# Patient Record
Sex: FEMALE | Race: WHITE | NOT HISPANIC OR LATINO | RURAL
[De-identification: names, ages, dates, MRNs, and addresses within clinical notes are randomized per-mention and may not be internally consistent; named-entity substitution may affect disease eponyms.]

---

## 2017-03-02 ENCOUNTER — OFFICE VISIT (OUTPATIENT)
Dept: ENDOCRINOLOGY | Facility: CLINIC | Age: 62
End: 2017-03-02

## 2017-03-02 VITALS
HEART RATE: 100 BPM | BODY MASS INDEX: 39.69 KG/M2 | SYSTOLIC BLOOD PRESSURE: 150 MMHG | DIASTOLIC BLOOD PRESSURE: 80 MMHG | WEIGHT: 224 LBS | HEIGHT: 63 IN

## 2017-03-02 DIAGNOSIS — E11.59 HYPERTENSION ASSOCIATED WITH DIABETES (HCC): ICD-10-CM

## 2017-03-02 DIAGNOSIS — Z79.4 TYPE 2 DIABETES MELLITUS WITHOUT COMPLICATION, WITH LONG-TERM CURRENT USE OF INSULIN (HCC): Primary | ICD-10-CM

## 2017-03-02 DIAGNOSIS — E11.9 TYPE 2 DIABETES MELLITUS WITHOUT COMPLICATION, WITH LONG-TERM CURRENT USE OF INSULIN (HCC): Primary | ICD-10-CM

## 2017-03-02 DIAGNOSIS — E66.01 MORBID OBESITY DUE TO EXCESS CALORIES (HCC): ICD-10-CM

## 2017-03-02 DIAGNOSIS — E78.2 MIXED HYPERLIPIDEMIA DUE TO TYPE 2 DIABETES MELLITUS (HCC): ICD-10-CM

## 2017-03-02 DIAGNOSIS — E55.9 VITAMIN D DEFICIENCY: ICD-10-CM

## 2017-03-02 DIAGNOSIS — I15.2 HYPERTENSION ASSOCIATED WITH DIABETES (HCC): ICD-10-CM

## 2017-03-02 DIAGNOSIS — E11.69 MIXED HYPERLIPIDEMIA DUE TO TYPE 2 DIABETES MELLITUS (HCC): ICD-10-CM

## 2017-03-02 PROCEDURE — 99214 OFFICE O/P EST MOD 30 MIN: CPT | Performed by: INTERNAL MEDICINE

## 2017-03-02 RX ORDER — ROSUVASTATIN CALCIUM 20 MG/1
20 TABLET, COATED ORAL DAILY
Qty: 30 TABLET | Refills: 11 | Status: SHIPPED | OUTPATIENT
Start: 2017-03-02

## 2017-03-02 RX ORDER — AZITHROMYCIN 250 MG/1
TABLET, FILM COATED ORAL
Qty: 6 TABLET | Refills: 0 | Status: SHIPPED | OUTPATIENT
Start: 2017-03-02 | End: 2017-03-07

## 2017-03-02 NOTE — PATIENT INSTRUCTIONS
Humalog or Novolog or Apidra with meals      For every 15 grams of carbohydrates you take 3 units    +     Sliding scale before meals  For ever 50 points above 150 - take 2 units      Example    Your sugar is 237 and you will eat 60 grams    For the 237 it would be 4 units     For the 60 grams it would be 12 units     Total = 16 units     if you have a low sugar , decrease the amount of insulin per 15 grams to 2 units     ---    Stop tresiba and victoza    Replace with soliqua 45 units every morning , 30 minutes before bkfast    If your morning sugar is more than 150 for 3 days increase by 3 units to a max dose of 60      --    Keep metformin 500 mg xr with supper    ==    Add invokana 100 mg before bkfast, flushes sugar out     Stop if urinary or yeast infections     ======    Cholesterol stop mevacor, start crestor 20 mg at night

## 2017-03-02 NOTE — PROGRESS NOTES
"  Subjective    Daphnie Jay is a 62 y.o. female. she is here today for follow-up.    History of Present Illness         REASON - Diabetes     Duration 10 years      Timing - Diabetes is Constant     Quality - Uncontrolled - better controlled      Severity - moderate     Complications - none      Current symptoms/problems  polydipsia and polyuria - no longer      Alleviating Factors: Compliance      Side Effects none     Current diet  in general, an \"unhealthy\" diet - improved diet      Current exercise none     Current monitoring regimen: home blood tests - 3 times daily  Home blood sugar records: 120 up to 190    Lately hyperglycemia, had URI      Hypoglycemia none      The following portions of the patient's history were reviewed and updated as appropriate:   Past Medical History   Diagnosis Date   • Type 2 diabetes mellitus without complication 8/20/2016     Past Surgical History   Procedure Laterality Date   • Fracture surgery Left      Family History   Problem Relation Age of Onset   • Diabetes Mother      OB History     No data available        Current Outpatient Prescriptions   Medication Sig Dispense Refill   • aspirin 81 MG chewable tablet Chew 81 mg daily.     • Insulin Degludec (TRESIBA FLEXTOUCH) 100 UNIT/ML solution pen-injector Inject 45 Units under the skin every night. 15 mL 11   • Insulin Pen Needle (B-D UF III MINI PEN NEEDLES) 31G X 5 MM misc Use 2 times daily 60 each 11   • Liraglutide 18 MG/3ML solution pen-injector Inject 1.8 mg under the skin Daily. 1.2 mg subcutaneously daily 3 pen 11   • lisinopril (PRINIVIL,ZESTRIL) 5 MG tablet   0   • lovastatin (MEVACOR) 40 MG tablet   0   • metFORMIN XR (GLUCOPHAGE XR) 500 MG 24 hr tablet Take 2 tablets by mouth Daily With Dinner. 60 tablet 11   • ONE TOUCH ULTRA TEST test strip   1     No current facility-administered medications for this visit.      Allergies   Allergen Reactions   • Demerol [Meperidine]      Social History     Social History   • " Marital status:      Spouse name: N/A   • Number of children: N/A   • Years of education: N/A     Social History Main Topics   • Smoking status: Never Smoker   • Smokeless tobacco: Never Used   • Alcohol use No   • Drug use: None   • Sexual activity: Not Asked     Other Topics Concern   • None     Social History Narrative       Review of Systems  Review of Systems   Constitutional: Negative for activity change, appetite change, diaphoresis and fatigue.   HENT: Negative for congestion, dental problem, drooling, ear discharge, ear pain, facial swelling, sneezing, sore throat, tinnitus, trouble swallowing and voice change.    Eyes: Negative for photophobia, pain, discharge, redness, itching and visual disturbance.   Respiratory: Negative for apnea, cough, choking, chest tightness and shortness of breath.    Cardiovascular: Negative for chest pain, palpitations and leg swelling.   Gastrointestinal: Negative for abdominal distention, abdominal pain, constipation, diarrhea, nausea and vomiting.   Endocrine: Negative for cold intolerance, heat intolerance, polydipsia, polyphagia and polyuria.   Genitourinary: Negative for difficulty urinating, dysuria, frequency, hematuria and urgency.   Musculoskeletal: Negative for arthralgias, back pain, gait problem, joint swelling, myalgias, neck pain and neck stiffness.   Skin: Negative for color change, pallor, rash and wound.   Allergic/Immunologic: Negative for environmental allergies, food allergies and immunocompromised state.   Neurological: Negative for dizziness, tremors, facial asymmetry, weakness, light-headedness, numbness and headaches.   Hematological: Negative for adenopathy. Does not bruise/bleed easily.   Psychiatric/Behavioral: Negative for agitation, behavioral problems, confusion, decreased concentration, dysphoric mood and sleep disturbance.        Objective      Visit Vitals   • /80 (BP Location: Left arm, Patient Position: Sitting, Cuff Size: Adult)  "  • Pulse 100   • Ht 63\" (160 cm)   • Wt 224 lb (102 kg)   • BMI 39.68 kg/m2     Physical Exam   Constitutional: She is oriented to person, place, and time. She appears well-developed and well-nourished. No distress.   HENT:   Head: Normocephalic and atraumatic.   Right Ear: External ear normal.   Left Ear: External ear normal.   Nose: Nose normal.   Eyes: Conjunctivae and EOM are normal. Pupils are equal, round, and reactive to light.   Neck: Normal range of motion. Neck supple. No tracheal deviation present. No thyromegaly present.   Cardiovascular: Normal rate, regular rhythm and normal heart sounds.    No murmur heard.  Pulmonary/Chest: Effort normal and breath sounds normal. No respiratory distress. She has no wheezes.   Abdominal: Soft. Bowel sounds are normal. There is no tenderness. There is no rebound and no guarding.   Musculoskeletal: Normal range of motion. She exhibits no edema, tenderness or deformity.   Left arm in sling   Neurological: She is alert and oriented to person, place, and time. No cranial nerve deficit.   Skin: Skin is warm and dry. No rash noted.   Psychiatric: She has a normal mood and affect. Her behavior is normal. Judgment and thought content normal.       Lab Review  GLUCOSE (mg/dl)   Date Value   12/22/2016 234 (H)     SODIUM (mmol/L)   Date Value   12/22/2016 137     POTASSIUM (mmol/L)   Date Value   12/22/2016 4.6     CHLORIDE (mmol/L)   Date Value   12/22/2016 97     CO2 (mmol/L)   Date Value   12/22/2016 26     BUN (mg/dl)   Date Value   12/22/2016 15     CREATININE (mg/dl)   Date Value   12/22/2016 0.7     HEMOGLOBIN A1C (%TotHgb)   Date Value   12/22/2016 10.3 (H)     TRIGLYCERIDES (mg/dl)   Date Value   12/22/2016 205 (H)       Assessment/Plan      1. Type 2 diabetes mellitus without complication, with long-term current use of insulin    2. Mixed hyperlipidemia due to type 2 diabetes mellitus    3. Hypertension associated with diabetes    4. Vitamin D deficiency    5. Morbid " obesity due to excess calories    .    Medications prescribed:  Outpatient Encounter Prescriptions as of 3/2/2017   Medication Sig Dispense Refill   • aspirin 81 MG chewable tablet Chew 81 mg daily.     • Insulin Degludec (TRESIBA FLEXTOUCH) 100 UNIT/ML solution pen-injector Inject 45 Units under the skin every night. 15 mL 11   • Insulin Pen Needle (B-D UF III MINI PEN NEEDLES) 31G X 5 MM misc Use 2 times daily 60 each 11   • Liraglutide 18 MG/3ML solution pen-injector Inject 1.8 mg under the skin Daily. 1.2 mg subcutaneously daily 3 pen 11   • lisinopril (PRINIVIL,ZESTRIL) 5 MG tablet   0   • lovastatin (MEVACOR) 40 MG tablet   0   • metFORMIN XR (GLUCOPHAGE XR) 500 MG 24 hr tablet Take 2 tablets by mouth Daily With Dinner. 60 tablet 11   • ONE TOUCH ULTRA TEST test strip   1     No facility-administered encounter medications on file as of 3/2/2017.        Orders placed during this encounter include:  No orders of the defined types were placed in this encounter.        Type 2 diabetes mellitus without complication  Previously we stopped avandia and CAMPUZANO  bg have improved from 300s to 100-200     Lab Results   Component Value Date    HGBA1C 10.3 (H) 12/22/2016         Metformin 500 mg ER at night increase to 1000 mg ER --just taking one  diarrhea w IR     Tresiba 45 units , change to whatever insurance pays for      Victoza 1.2 mg daily - increase to 1.8--back down to 1.2 mg , continue paid for      Has had mycotic infections in the past, and glucose readings are at goal at this time      Start Mealtime Insulin novolog 3 units per carb and 2 per 50     Add invokana 100 mg daily   ---     Has had pneumovax before age 65  Flu Vaccine in 2016  --     Microvascular Monitoring     2015 , no  December  No neuropathy      Morbid obesity due to excess calories  Obesity is newly identified.  Discussed the patient's BMI. The BMI is above average; BMI management plan is completed.  General weight loss/lifestyle modification  strategies discussed (elicit support from others; identify saboteurs; non-food rewards, etc).  Diet interventions: moderate (500 kCal/d) deficit diet.            Mixed hyperlipidemia due to type 2 diabetes mellitus  On mevacor 40 mg qhs, not working, change to crestor 20 mg at night      No results found for: CHOL  Lab Results   Component Value Date    TRIG 205 (H) 12/22/2016     Lab Results   Component Value Date    HDL 45 (L) 12/22/2016     Lab Results   Component Value Date    LDLCALC 161 (H) 12/22/2016       Hypertension associated with diabetes  Hypertension is newly identified.  Continue current treatment regimen.  Dietary sodium restriction.  Weight loss.  Regular aerobic exercise.  Continue current medications.       On lisinopril 5 mg daily   due to sickness                  4. Follow-up: No Follow-up on file.   Labs today

## 2017-04-05 ENCOUNTER — TELEPHONE (OUTPATIENT)
Dept: ENDOCRINOLOGY | Facility: CLINIC | Age: 62
End: 2017-04-05

## 2017-04-06 ENCOUNTER — TELEPHONE (OUTPATIENT)
Dept: ENDOCRINOLOGY | Facility: CLINIC | Age: 62
End: 2017-04-06

## 2017-04-11 ENCOUNTER — TELEPHONE (OUTPATIENT)
Dept: ENDOCRINOLOGY | Facility: CLINIC | Age: 62
End: 2017-04-11

## 2017-06-09 ENCOUNTER — TELEPHONE (OUTPATIENT)
Dept: ENDOCRINOLOGY | Facility: CLINIC | Age: 62
End: 2017-06-09

## 2017-06-09 NOTE — TELEPHONE ENCOUNTER
MAIL ORDER   ONE TOUCH ULTRA MINI TESTING STRIPS   [employer]   [phone]   [email or web address]     THEY WOULD PREFER A 3MO SUPPLY.   TESTING 3TIMES A DAY.     IF YOU HAVE MORE QUESTIONS CALL PATIENT AT [phone].  (Routing comment)

## 2017-10-19 ENCOUNTER — OFFICE VISIT (OUTPATIENT)
Dept: ENDOCRINOLOGY | Facility: CLINIC | Age: 62
End: 2017-10-19

## 2017-10-19 VITALS
HEIGHT: 63 IN | WEIGHT: 230 LBS | SYSTOLIC BLOOD PRESSURE: 130 MMHG | HEART RATE: 114 BPM | DIASTOLIC BLOOD PRESSURE: 78 MMHG | BODY MASS INDEX: 40.75 KG/M2

## 2017-10-19 DIAGNOSIS — E11.9 TYPE 2 DIABETES MELLITUS WITHOUT COMPLICATION, WITH LONG-TERM CURRENT USE OF INSULIN (HCC): Primary | ICD-10-CM

## 2017-10-19 DIAGNOSIS — E78.2 MIXED HYPERLIPIDEMIA DUE TO TYPE 2 DIABETES MELLITUS (HCC): ICD-10-CM

## 2017-10-19 DIAGNOSIS — E11.59 HYPERTENSION ASSOCIATED WITH DIABETES (HCC): ICD-10-CM

## 2017-10-19 DIAGNOSIS — I15.2 HYPERTENSION ASSOCIATED WITH DIABETES (HCC): ICD-10-CM

## 2017-10-19 DIAGNOSIS — E11.69 MIXED HYPERLIPIDEMIA DUE TO TYPE 2 DIABETES MELLITUS (HCC): ICD-10-CM

## 2017-10-19 DIAGNOSIS — E55.9 VITAMIN D DEFICIENCY: ICD-10-CM

## 2017-10-19 DIAGNOSIS — Z79.4 TYPE 2 DIABETES MELLITUS WITHOUT COMPLICATION, WITH LONG-TERM CURRENT USE OF INSULIN (HCC): Primary | ICD-10-CM

## 2017-10-19 DIAGNOSIS — J40 BRONCHITIS: ICD-10-CM

## 2017-10-19 LAB
25(OH)D3 SERPL-MCNC: 20.2 NG/ML (ref 30–100)
ALBUMIN SERPL-MCNC: 4.6 G/DL (ref 3.4–4.8)
ALBUMIN UR-MCNC: 1.2 MG/L
ALBUMIN/GLOB SERPL: 1.4 G/DL (ref 1.1–1.8)
ALP SERPL-CCNC: 70 U/L (ref 38–126)
ALT SERPL W P-5'-P-CCNC: 56 U/L (ref 9–52)
ANION GAP SERPL CALCULATED.3IONS-SCNC: 17 MMOL/L (ref 5–15)
ARTICHOKE IGE QN: 98 MG/DL (ref 1–129)
AST SERPL-CCNC: 33 U/L (ref 14–36)
BASOPHILS # BLD AUTO: 0.02 10*3/MM3 (ref 0–0.2)
BASOPHILS NFR BLD AUTO: 0.3 % (ref 0–2)
BILIRUB SERPL-MCNC: 1.4 MG/DL (ref 0.2–1.3)
BUN BLD-MCNC: 13 MG/DL (ref 7–21)
BUN/CREAT SERPL: 18.8 (ref 7–25)
CALCIUM SPEC-SCNC: 10.2 MG/DL (ref 8.4–10.2)
CHLORIDE SERPL-SCNC: 102 MMOL/L (ref 95–110)
CHOLEST SERPL-MCNC: 172 MG/DL (ref 0–199)
CO2 SERPL-SCNC: 21 MMOL/L (ref 22–31)
CREAT BLD-MCNC: 0.69 MG/DL (ref 0.5–1)
CREAT UR-MCNC: 160.3 MG/DL
DEPRECATED RDW RBC AUTO: 39.7 FL (ref 36.4–46.3)
EOSINOPHIL # BLD AUTO: 0.19 10*3/MM3 (ref 0–0.7)
EOSINOPHIL NFR BLD AUTO: 2.5 % (ref 0–7)
ERYTHROCYTE [DISTWIDTH] IN BLOOD BY AUTOMATED COUNT: 12.3 % (ref 11.5–14.5)
GFR SERPL CREATININE-BSD FRML MDRD: 86 ML/MIN/1.73 (ref 45–104)
GLOBULIN UR ELPH-MCNC: 3.3 GM/DL (ref 2.3–3.5)
GLUCOSE BLD-MCNC: 175 MG/DL (ref 60–100)
HBA1C MFR BLD: 8.3 % (ref 4–5.6)
HCT VFR BLD AUTO: 47 % (ref 35–45)
HDLC SERPL-MCNC: 53 MG/DL (ref 60–200)
HGB BLD-MCNC: 16.2 G/DL (ref 12–15.5)
IMM GRANULOCYTES # BLD: 0.03 10*3/MM3 (ref 0–0.02)
IMM GRANULOCYTES NFR BLD: 0.4 % (ref 0–0.5)
LDLC/HDLC SERPL: 1.69 {RATIO} (ref 0–3.22)
LYMPHOCYTES # BLD AUTO: 2.77 10*3/MM3 (ref 0.6–4.2)
LYMPHOCYTES NFR BLD AUTO: 37 % (ref 10–50)
MCH RBC QN AUTO: 30.9 PG (ref 26.5–34)
MCHC RBC AUTO-ENTMCNC: 34.5 G/DL (ref 31.4–36)
MCV RBC AUTO: 89.7 FL (ref 80–98)
MICROALBUMIN/CREAT UR: 7.5 MG/G (ref 0–30)
MONOCYTES # BLD AUTO: 0.59 10*3/MM3 (ref 0–0.9)
MONOCYTES NFR BLD AUTO: 7.9 % (ref 0–12)
NEUTROPHILS # BLD AUTO: 3.88 10*3/MM3 (ref 2–8.6)
NEUTROPHILS NFR BLD AUTO: 51.9 % (ref 37–80)
PLATELET # BLD AUTO: 191 10*3/MM3 (ref 150–450)
PMV BLD AUTO: 9.8 FL (ref 8–12)
POTASSIUM BLD-SCNC: 4.4 MMOL/L (ref 3.5–5.1)
PROT SERPL-MCNC: 7.9 G/DL (ref 6.3–8.6)
RBC # BLD AUTO: 5.24 10*6/MM3 (ref 3.77–5.16)
SODIUM BLD-SCNC: 140 MMOL/L (ref 137–145)
TRIGL SERPL-MCNC: 147 MG/DL (ref 20–199)
TSH SERPL DL<=0.05 MIU/L-ACNC: 1.66 MIU/ML (ref 0.46–4.68)
VIT B12 BLD-MCNC: 482 PG/ML (ref 239–931)
WBC NRBC COR # BLD: 7.48 10*3/MM3 (ref 3.2–9.8)

## 2017-10-19 PROCEDURE — 82570 ASSAY OF URINE CREATININE: CPT | Performed by: NURSE PRACTITIONER

## 2017-10-19 PROCEDURE — 99214 OFFICE O/P EST MOD 30 MIN: CPT | Performed by: NURSE PRACTITIONER

## 2017-10-19 PROCEDURE — 36415 COLL VENOUS BLD VENIPUNCTURE: CPT | Performed by: FAMILY MEDICINE

## 2017-10-19 PROCEDURE — 84156 ASSAY OF PROTEIN URINE: CPT | Performed by: NURSE PRACTITIONER

## 2017-10-19 PROCEDURE — 83036 HEMOGLOBIN GLYCOSYLATED A1C: CPT | Performed by: INTERNAL MEDICINE

## 2017-10-19 PROCEDURE — 85025 COMPLETE CBC W/AUTO DIFF WBC: CPT | Performed by: INTERNAL MEDICINE

## 2017-10-19 PROCEDURE — 82306 VITAMIN D 25 HYDROXY: CPT | Performed by: INTERNAL MEDICINE

## 2017-10-19 PROCEDURE — 82043 UR ALBUMIN QUANTITATIVE: CPT | Performed by: NURSE PRACTITIONER

## 2017-10-19 PROCEDURE — 80053 COMPREHEN METABOLIC PANEL: CPT | Performed by: INTERNAL MEDICINE

## 2017-10-19 PROCEDURE — 82607 VITAMIN B-12: CPT | Performed by: INTERNAL MEDICINE

## 2017-10-19 PROCEDURE — 84443 ASSAY THYROID STIM HORMONE: CPT | Performed by: INTERNAL MEDICINE

## 2017-10-19 PROCEDURE — 80061 LIPID PANEL: CPT | Performed by: INTERNAL MEDICINE

## 2017-10-19 RX ORDER — BENZONATATE 100 MG/1
100 CAPSULE ORAL 3 TIMES DAILY PRN
Qty: 90 CAPSULE | Refills: 0 | Status: SHIPPED | OUTPATIENT
Start: 2017-10-19 | End: 2018-10-19

## 2017-10-19 RX ORDER — CEFUROXIME AXETIL 250 MG/1
250 TABLET ORAL 2 TIMES DAILY
Qty: 20 TABLET | Refills: 0 | Status: SHIPPED | OUTPATIENT
Start: 2017-10-19 | End: 2017-10-29

## 2017-10-19 NOTE — PROGRESS NOTES
"  Subjective    Daphnie Jay is a 62 y.o. female. she is here today for follow-up.    History of Present Illness         REASON - Diabetes     Duration 10 years      Timing - Diabetes is Constant     Quality - Uncontrolled - better controlled      Severity - moderate     Complications - none     Current symptoms/problems  polydipsia and polyuria - no longer      Alleviating Factors: Compliance      Side Effects none     Current diet  in general, an \"unhealthy\" diet - improved diet      Current exercise none     Current monitoring regimen: home blood tests - 3 times daily  Home blood sugar records: 120 up to 190     Lately hyperglycemia, had URI --recent steroid      Hypoglycemia none      The following portions of the patient's history were reviewed and updated as appropriate:   Past Medical History:   Diagnosis Date   • Type 2 diabetes mellitus without complication 8/20/2016     Past Surgical History:   Procedure Laterality Date   • FRACTURE SURGERY Left      Family History   Problem Relation Age of Onset   • Diabetes Mother      OB History     No data available        Current Outpatient Prescriptions   Medication Sig Dispense Refill   • aspirin 81 MG chewable tablet Chew 81 mg daily.     • Canagliflozin 100 MG tablet One tablet daily by mouth before breakfast 30 tablet 11   • glucose blood (DMITRI CONTOUR NEXT TEST) test strip Use as instructed 4 x daily 200 each 12   • Insulin Glargine-Lixisenatide (SOLIQUA) 100-33 UNT-MCG/ML solution pen-injector Inject 50 Units under the skin Daily.     • Insulin Lispro (HUMALOG) 100 UNIT/ML solution pen-injector 25 units with meals 9 pen 11   • Insulin Pen Needle (B-D UF III MINI PEN NEEDLES) 31G X 5 MM misc Use 4 times daily 120 each 11   • lisinopril (PRINIVIL,ZESTRIL) 5 MG tablet   0   • lovastatin (MEVACOR) 40 MG tablet   0   • rosuvastatin (CRESTOR) 20 MG tablet Take 1 tablet by mouth Daily. 30 tablet 11   • benzonatate (TESSALON PERLES) 100 MG capsule Take 1 capsule " by mouth 3 (Three) Times a Day As Needed for Cough. 90 capsule 0   • cefuroxime (CEFTIN) 250 MG tablet Take 1 tablet by mouth 2 (Two) Times a Day for 10 days. 20 tablet 0     No current facility-administered medications for this visit.      Allergies   Allergen Reactions   • Demerol [Meperidine]      Social History     Social History   • Marital status:      Spouse name: N/A   • Number of children: N/A   • Years of education: N/A     Social History Main Topics   • Smoking status: Never Smoker   • Smokeless tobacco: Never Used   • Alcohol use No   • Drug use: None   • Sexual activity: Not Asked     Other Topics Concern   • None     Social History Narrative       Review of Systems  Review of Systems   Constitutional: Positive for fatigue. Negative for activity change, appetite change and diaphoresis.   HENT: Negative for congestion, dental problem, drooling, facial swelling, sneezing, sore throat, tinnitus, trouble swallowing and voice change.    Eyes: Negative for photophobia, pain, discharge, redness, itching and visual disturbance.   Respiratory: Negative for apnea, cough, choking, chest tightness and shortness of breath.    Cardiovascular: Negative for chest pain, palpitations and leg swelling.   Gastrointestinal: Negative for abdominal distention, abdominal pain, constipation, diarrhea, nausea and vomiting.   Endocrine: Positive for polyphagia. Negative for cold intolerance, heat intolerance, polydipsia and polyuria.   Genitourinary: Negative for difficulty urinating, dysuria, frequency, hematuria and urgency.   Musculoskeletal: Negative for arthralgias, back pain, gait problem, joint swelling, myalgias, neck pain and neck stiffness.   Skin: Negative for color change, pallor, rash and wound.   Allergic/Immunologic: Negative for environmental allergies, food allergies and immunocompromised state.   Neurological: Positive for headaches. Negative for dizziness, tremors, seizures, facial asymmetry, speech  "difficulty, weakness, light-headedness and numbness.   Hematological: Negative for adenopathy. Does not bruise/bleed easily.   Psychiatric/Behavioral: Negative for agitation, behavioral problems, confusion and sleep disturbance. The patient is not nervous/anxious.         Objective    /78 (BP Location: Left arm, Patient Position: Sitting, Cuff Size: Adult)  Pulse 114  Ht 63\" (160 cm)  Wt 230 lb (104 kg)  BMI 40.74 kg/m2  Physical Exam   Constitutional: She is oriented to person, place, and time. She appears well-developed and well-nourished. No distress.   HENT:   Head: Normocephalic and atraumatic.   Right Ear: External ear normal.   Left Ear: External ear normal.   Nose: Nose normal.   Eyes: Conjunctivae and EOM are normal. Pupils are equal, round, and reactive to light.   Neck: Normal range of motion. Neck supple. No tracheal deviation present. No thyromegaly present.   Cardiovascular: Normal rate, regular rhythm and normal heart sounds.    No murmur heard.  Pulmonary/Chest: Effort normal and breath sounds normal. No respiratory distress. She has no wheezes.   Abdominal: Soft. Bowel sounds are normal. There is no tenderness. There is no rebound and no guarding.   Musculoskeletal: Normal range of motion. She exhibits no edema, tenderness or deformity.   Neurological: She is alert and oriented to person, place, and time. No cranial nerve deficit.   Skin: Skin is warm and dry. No rash noted.   Psychiatric: She has a normal mood and affect. Her behavior is normal. Judgment and thought content normal.       Lab Review  Glucose   Date Value   10/19/2017 175 mg/dL (H)   12/22/2016 234 mg/dl (H)     Sodium (mmol/L)   Date Value   10/19/2017 140   12/22/2016 137     Potassium (mmol/L)   Date Value   10/19/2017 4.4   12/22/2016 4.6     Chloride (mmol/L)   Date Value   10/19/2017 102   12/22/2016 97     CO2 (mmol/L)   Date Value   10/19/2017 21.0 (L)   12/22/2016 26     BUN   Date Value   10/19/2017 13 mg/dL "   12/22/2016 15 mg/dl     Creatinine   Date Value   10/19/2017 0.69 mg/dL   12/22/2016 0.7 mg/dl     Hemoglobin A1C   Date Value   10/19/2017 8.3 % (H)   12/22/2016 10.3 %TotHgb (H)     Triglycerides   Date Value   10/19/2017 147 mg/dL   12/22/2016 205 mg/dl (H)       Assessment/Plan      1. Type 2 diabetes mellitus without complication, with long-term current use of insulin    2. Mixed hyperlipidemia due to type 2 diabetes mellitus    3. Hypertension associated with diabetes    4. Vitamin D deficiency    5. Bronchitis    .    Medications prescribed:  Outpatient Encounter Prescriptions as of 10/19/2017   Medication Sig Dispense Refill   • aspirin 81 MG chewable tablet Chew 81 mg daily.     • Canagliflozin 100 MG tablet One tablet daily by mouth before breakfast 30 tablet 11   • glucose blood (DMITRI CONTOUR NEXT TEST) test strip Use as instructed 4 x daily 200 each 12   • Insulin Glargine-Lixisenatide (SOLIQUA) 100-33 UNT-MCG/ML solution pen-injector Inject 50 Units under the skin Daily.     • Insulin Lispro (HUMALOG) 100 UNIT/ML solution pen-injector 25 units with meals 9 pen 11   • Insulin Pen Needle (B-D UF III MINI PEN NEEDLES) 31G X 5 MM misc Use 4 times daily 120 each 11   • lisinopril (PRINIVIL,ZESTRIL) 5 MG tablet   0   • lovastatin (MEVACOR) 40 MG tablet   0   • rosuvastatin (CRESTOR) 20 MG tablet Take 1 tablet by mouth Daily. 30 tablet 11   • [DISCONTINUED] Insulin Pen Needle (B-D UF III MINI PEN NEEDLES) 31G X 5 MM misc Use 2 times daily 60 each 11   • benzonatate (TESSALON PERLES) 100 MG capsule Take 1 capsule by mouth 3 (Three) Times a Day As Needed for Cough. 90 capsule 0   • cefuroxime (CEFTIN) 250 MG tablet Take 1 tablet by mouth 2 (Two) Times a Day for 10 days. 20 tablet 0   • [DISCONTINUED] metFORMIN XR (GLUCOPHAGE XR) 500 MG 24 hr tablet Take 2 tablets by mouth Daily With Dinner. 60 tablet 11     No facility-administered encounter medications on file as of 10/19/2017.        Orders placed during  this encounter include:  No orders of the defined types were placed in this encounter.    T                          Type 2 Diabetes Mellitus      Metformin 500 mg ER at night increase to 1000 mg ER --just taking one  diarrhea w IR     Tresiba 45 units , change to whatever insurance pays for --stopped      Victoza 1.2 mg daily - increase to 1.8--back down to 1.2 mg , continue paid for - stopped    Soliqua - 40 units --- increase  45 units      Has had mycotic infections in the past, and glucose readings are at goal at this time      Humalog        3 units per carb-- 4 units per carb      and 2 per 50       invokana 100 mg daily   ---     Has had pneumovax before age 65  Flu Vaccine in 2016  --     Microvascular Monitoring     2015 ivone DR neuropathy      Morbid obesity due to excess calories  Obesity is newly identified.  Discussed the patient's BMI. The BMI is above average; BMI management plan is completed.  General weight loss/lifestyle modification strategies discussed (elicit support from others; identify saboteurs; non-food rewards, etc).  Diet interventions: moderate (500 kCal/d) deficit diet.            Mixed hyperlipidemia due to type 2 diabetes mellitus  On mevacor 40 mg qhs, not working, change to crestor 20 mg at night         Total Cholesterol   Date Value Ref Range Status   10/19/2017 172 0 - 199 mg/dL Final     Triglycerides   Date Value Ref Range Status   10/19/2017 147 20 - 199 mg/dL Final     HDL Cholesterol   Date Value Ref Range Status   10/19/2017 53 (L) 60 - 200 mg/dL Final     LDL Cholesterol    Date Value Ref Range Status   10/19/2017 98 1 - 129 mg/dL Final                                   Hypertension associated with diabetes  Hypertension is newly identified.  Continue current treatment regimen.  Dietary sodium restriction.  Weight loss.  Regular aerobic exercise.  Continue current medications.        On lisinopril 5 mg daily   due to sickness      Vitamin d def.    Vitamin d  20     Start taking vitamin d     Bronchitis       Tessalon Perles 100 mg TID  Continue inhaler  ceftin 250 bid for 10 days                          4. Follow-up: Return in about 4 months (around 2/19/2018) for Recheck.

## 2017-10-25 ENCOUNTER — TELEPHONE (OUTPATIENT)
Dept: FAMILY MEDICINE CLINIC | Facility: CLINIC | Age: 62
End: 2017-10-25

## 2017-11-28 ENCOUNTER — TELEPHONE (OUTPATIENT)
Dept: ENDOCRINOLOGY | Facility: CLINIC | Age: 62
End: 2017-11-28

## 2018-02-13 LAB
BASOPHILS # BLD AUTO: 0.02 10*3/MM3 (ref 0–0.2)
BASOPHILS NFR BLD AUTO: 0.3 % (ref 0–2)
DEPRECATED RDW RBC AUTO: 37.7 FL (ref 36.4–46.3)
EOSINOPHIL # BLD AUTO: 0.2 10*3/MM3 (ref 0–0.7)
EOSINOPHIL NFR BLD AUTO: 2.5 % (ref 0–7)
ERYTHROCYTE [DISTWIDTH] IN BLOOD BY AUTOMATED COUNT: 11.7 % (ref 11.5–14.5)
HCT VFR BLD AUTO: 44.6 % (ref 35–45)
HGB BLD-MCNC: 15.3 G/DL (ref 12–15.5)
IMM GRANULOCYTES # BLD: 0.01 10*3/MM3 (ref 0–0.02)
IMM GRANULOCYTES NFR BLD: 0.1 % (ref 0–0.5)
LYMPHOCYTES # BLD AUTO: 3.76 10*3/MM3 (ref 0.6–4.2)
LYMPHOCYTES NFR BLD AUTO: 47.5 % (ref 10–50)
MCH RBC QN AUTO: 30 PG (ref 26.5–34)
MCHC RBC AUTO-ENTMCNC: 34.3 G/DL (ref 31.4–36)
MCV RBC AUTO: 87.5 FL (ref 80–98)
MONOCYTES # BLD AUTO: 0.59 10*3/MM3 (ref 0–0.9)
MONOCYTES NFR BLD AUTO: 7.5 % (ref 0–12)
NEUTROPHILS # BLD AUTO: 3.33 10*3/MM3 (ref 2–8.6)
NEUTROPHILS NFR BLD AUTO: 42.1 % (ref 37–80)
PLATELET # BLD AUTO: 177 10*3/MM3 (ref 150–450)
PMV BLD AUTO: 9.8 FL (ref 8–12)
RBC # BLD AUTO: 5.1 10*6/MM3 (ref 3.77–5.16)
WBC NRBC COR # BLD: 7.91 10*3/MM3 (ref 3.2–9.8)

## 2018-02-13 PROCEDURE — 82306 VITAMIN D 25 HYDROXY: CPT | Performed by: NURSE PRACTITIONER

## 2018-02-13 PROCEDURE — 85025 COMPLETE CBC W/AUTO DIFF WBC: CPT | Performed by: NURSE PRACTITIONER

## 2018-02-13 PROCEDURE — 83036 HEMOGLOBIN GLYCOSYLATED A1C: CPT | Performed by: NURSE PRACTITIONER

## 2018-02-13 PROCEDURE — 36415 COLL VENOUS BLD VENIPUNCTURE: CPT | Performed by: FAMILY MEDICINE

## 2018-02-13 PROCEDURE — 80053 COMPREHEN METABOLIC PANEL: CPT | Performed by: NURSE PRACTITIONER

## 2018-02-14 LAB
25(OH)D3 SERPL-MCNC: 29.1 NG/ML (ref 30–100)
ALBUMIN SERPL-MCNC: 4.2 G/DL (ref 3.4–4.8)
ALBUMIN/GLOB SERPL: 1.4 G/DL (ref 1.1–1.8)
ALP SERPL-CCNC: 76 U/L (ref 38–126)
ALT SERPL W P-5'-P-CCNC: 55 U/L (ref 9–52)
ANION GAP SERPL CALCULATED.3IONS-SCNC: 15 MMOL/L (ref 5–15)
AST SERPL-CCNC: 31 U/L (ref 14–36)
BILIRUB SERPL-MCNC: 0.9 MG/DL (ref 0.2–1.3)
BUN BLD-MCNC: 12 MG/DL (ref 7–21)
BUN/CREAT SERPL: 22.2 (ref 7–25)
CALCIUM SPEC-SCNC: 9.7 MG/DL (ref 8.4–10.2)
CHLORIDE SERPL-SCNC: 102 MMOL/L (ref 95–110)
CO2 SERPL-SCNC: 24 MMOL/L (ref 22–31)
CREAT BLD-MCNC: 0.54 MG/DL (ref 0.5–1)
GFR SERPL CREATININE-BSD FRML MDRD: 114 ML/MIN/1.73 (ref 45–104)
GLOBULIN UR ELPH-MCNC: 3 GM/DL (ref 2.3–3.5)
GLUCOSE BLD-MCNC: 124 MG/DL (ref 60–100)
HBA1C MFR BLD: 9.3 % (ref 4–5.6)
POTASSIUM BLD-SCNC: 4.1 MMOL/L (ref 3.5–5.1)
PROT SERPL-MCNC: 7.2 G/DL (ref 6.3–8.6)
SODIUM BLD-SCNC: 141 MMOL/L (ref 137–145)

## 2018-02-19 ENCOUNTER — OFFICE VISIT (OUTPATIENT)
Dept: ENDOCRINOLOGY | Facility: CLINIC | Age: 63
End: 2018-02-19

## 2018-02-19 VITALS
HEIGHT: 63 IN | HEART RATE: 86 BPM | DIASTOLIC BLOOD PRESSURE: 78 MMHG | BODY MASS INDEX: 41.55 KG/M2 | SYSTOLIC BLOOD PRESSURE: 128 MMHG | WEIGHT: 234.5 LBS

## 2018-02-19 DIAGNOSIS — E11.59 HYPERTENSION ASSOCIATED WITH DIABETES (HCC): ICD-10-CM

## 2018-02-19 DIAGNOSIS — E55.9 VITAMIN D DEFICIENCY: ICD-10-CM

## 2018-02-19 DIAGNOSIS — E78.2 MIXED HYPERLIPIDEMIA DUE TO TYPE 2 DIABETES MELLITUS (HCC): ICD-10-CM

## 2018-02-19 DIAGNOSIS — E11.69 MIXED HYPERLIPIDEMIA DUE TO TYPE 2 DIABETES MELLITUS (HCC): ICD-10-CM

## 2018-02-19 DIAGNOSIS — E11.9 TYPE 2 DIABETES MELLITUS WITHOUT COMPLICATION, UNSPECIFIED LONG TERM INSULIN USE STATUS: Primary | ICD-10-CM

## 2018-02-19 DIAGNOSIS — I15.2 HYPERTENSION ASSOCIATED WITH DIABETES (HCC): ICD-10-CM

## 2018-02-19 DIAGNOSIS — E11.9 TYPE 2 DIABETES MELLITUS WITHOUT COMPLICATION, WITH LONG-TERM CURRENT USE OF INSULIN (HCC): Primary | ICD-10-CM

## 2018-02-19 DIAGNOSIS — Z79.4 TYPE 2 DIABETES MELLITUS WITHOUT COMPLICATION, WITH LONG-TERM CURRENT USE OF INSULIN (HCC): Primary | ICD-10-CM

## 2018-02-19 LAB — GLUCOSE BLDC GLUCOMTR-MCNC: 240 MG/DL (ref 70–130)

## 2018-02-19 PROCEDURE — 99214 OFFICE O/P EST MOD 30 MIN: CPT | Performed by: INTERNAL MEDICINE

## 2018-02-19 PROCEDURE — 95250 CONT GLUC MNTR PHYS/QHP EQP: CPT | Performed by: INTERNAL MEDICINE

## 2018-02-19 RX ORDER — FLASH GLUCOSE SENSOR
1 KIT MISCELLANEOUS AS NEEDED
Qty: 3 EACH | Refills: 11 | Status: SHIPPED | OUTPATIENT
Start: 2018-02-19 | End: 2018-12-17

## 2018-02-19 NOTE — PROGRESS NOTES
Subjective    Daphnie Jay is a 63 y.o. female. she is here today for follow-up.    Diabetes   She has type 2 diabetes mellitus. No MedicAlert identification noted. The initial diagnosis of diabetes was made 9 years ago. Hypoglycemia symptoms include headaches and hunger. Pertinent negatives for hypoglycemia include no confusion, dizziness, mood changes, nervousness/anxiousness, pallor, seizures, sleepiness, speech difficulty, sweats or tremors. Associated symptoms include blurred vision, fatigue, foot paresthesias and polyphagia. Pertinent negatives for diabetes include no chest pain, no foot ulcerations, no polydipsia, no polyuria, no visual change, no weakness and no weight loss. Pertinent negatives for hypoglycemia complications include no blackouts, no hospitalization, no nocturnal hypoglycemia, no required assistance and no required glucagon injection. Symptoms are stable. Diabetic complications include retinopathy. Pertinent negatives for diabetic complications include no CVA, heart disease, impotence, nephropathy, peripheral neuropathy or PVD. Risk factors for coronary artery disease include dyslipidemia, family history, obesity and post-menopausal. Current diabetic treatment includes insulin injections and oral agent (monotherapy). She is compliant with treatment all of the time. She is currently taking insulin pre-breakfast, pre-lunch, pre-dinner and at bedtime. Insulin injections are given by patient. Rotation sites for injection include the abdominal wall, buttocks and thighs. Her weight is stable. She is following a generally healthy and high fiber diet. Meal planning includes carbohydrate counting. She has not had a previous visit with a dietitian. She participates in exercise intermittently. She monitors blood glucose at home 3-4 x per day. She monitors urine at home <1 x per month. Blood glucose monitoring compliance is good. Her home blood glucose trend is fluctuating minimally. Her breakfast  "blood glucose is taken between 9-10 am. Her breakfast blood glucose range is generally 130-140 mg/dl. Her lunch blood glucose is taken between 2-3 pm. Her lunch blood glucose range is generally 140-180 mg/dl. Her dinner blood glucose is taken between 7-8 pm. Her dinner blood glucose range is generally 140-180 mg/dl. Her highest blood glucose is 140-180 mg/dl. Her overall blood glucose range is 140-180 mg/dl. She does not see a podiatrist.Eye exam is current.            REASON - Diabetes     Duration 10 years      Timing - Diabetes is Constant     Quality - Uncontrolled - better controlled      Severity - moderate     Complications - none     Current symptoms/problems  polydipsia and polyuria - no longer      Alleviating Factors: Compliance      Side Effects none     Current diet  in general, an \"unhealthy\" diet - improved diet      Current exercise none     Current monitoring regimen: home blood tests - 3 times daily  Home blood sugar records: 120 up to 190     Lately hyperglycemia, had URI --recent steroid      Hypoglycemia none      The following portions of the patient's history were reviewed and updated as appropriate:   Past Medical History:   Diagnosis Date   • Type 2 diabetes mellitus without complication 8/20/2016     Past Surgical History:   Procedure Laterality Date   • FRACTURE SURGERY Left      Family History   Problem Relation Age of Onset   • Diabetes Mother      OB History     No data available        Current Outpatient Prescriptions   Medication Sig Dispense Refill   • aspirin 81 MG chewable tablet Chew 81 mg daily.     • benzonatate (TESSALON PERLES) 100 MG capsule Take 1 capsule by mouth 3 (Three) Times a Day As Needed for Cough. 90 capsule 0   • Canagliflozin 100 MG tablet One tablet daily by mouth before breakfast 30 tablet 11   • glucose blood (DMITRI CONTOUR NEXT TEST) test strip Use as instructed 4 x daily 200 each 12   • Insulin Glargine-Lixisenatide (SOLIQUA) 100-33 UNT-MCG/ML solution " pen-injector Inject 50 Units under the skin Daily.     • Insulin Lispro (HUMALOG) 100 UNIT/ML solution pen-injector 25 units with meals 9 pen 11   • Insulin Pen Needle (B-D UF III MINI PEN NEEDLES) 31G X 5 MM misc Use 4 times daily 120 each 11   • lisinopril (PRINIVIL,ZESTRIL) 5 MG tablet   0   • lovastatin (MEVACOR) 40 MG tablet   0   • rosuvastatin (CRESTOR) 20 MG tablet Take 1 tablet by mouth Daily. 30 tablet 11     No current facility-administered medications for this visit.      Allergies   Allergen Reactions   • Demerol [Meperidine] Shortness Of Breath     Social History     Social History   • Marital status:      Spouse name: N/A   • Number of children: N/A   • Years of education: N/A     Social History Main Topics   • Smoking status: Never Smoker   • Smokeless tobacco: Never Used   • Alcohol use No   • Drug use: Not on file   • Sexual activity: Not on file     Other Topics Concern   • Not on file     Social History Narrative       Review of Systems  Review of Systems   Constitutional: Positive for fatigue. Negative for activity change, appetite change, diaphoresis and weight loss.   HENT: Negative for congestion, dental problem, drooling, facial swelling, sneezing, sore throat, tinnitus, trouble swallowing and voice change.    Eyes: Positive for blurred vision. Negative for photophobia, pain, discharge, redness, itching and visual disturbance.   Respiratory: Negative for apnea, cough, choking, chest tightness and shortness of breath.    Cardiovascular: Negative for chest pain, palpitations and leg swelling.   Gastrointestinal: Negative for abdominal distention, abdominal pain, constipation, diarrhea, nausea and vomiting.   Endocrine: Positive for polyphagia. Negative for cold intolerance, heat intolerance, polydipsia and polyuria.   Genitourinary: Negative for difficulty urinating, dysuria, frequency, hematuria, impotence and urgency.   Musculoskeletal: Negative for arthralgias, back pain, gait  "problem, joint swelling, myalgias, neck pain and neck stiffness.   Skin: Negative for color change, pallor, rash and wound.   Allergic/Immunologic: Negative for environmental allergies, food allergies and immunocompromised state.   Neurological: Positive for headaches. Negative for dizziness, tremors, seizures, facial asymmetry, speech difficulty, weakness, light-headedness and numbness.   Hematological: Negative for adenopathy. Does not bruise/bleed easily.   Psychiatric/Behavioral: Negative for agitation, behavioral problems, confusion and sleep disturbance. The patient is not nervous/anxious.         Objective    /78 (BP Location: Left arm, Patient Position: Sitting, Cuff Size: Large Adult)  Pulse 86  Ht 160 cm (62.99\")  Wt 106 kg (234 lb 8 oz)  BMI 41.55 kg/m2  Physical Exam   Constitutional: She is oriented to person, place, and time. She appears well-developed and well-nourished. No distress.   HENT:   Head: Normocephalic and atraumatic.   Right Ear: External ear normal.   Left Ear: External ear normal.   Nose: Nose normal.   Eyes: Conjunctivae and EOM are normal. Pupils are equal, round, and reactive to light.   Neck: Normal range of motion. Neck supple. No tracheal deviation present. No thyromegaly present.   Cardiovascular: Normal rate, regular rhythm and normal heart sounds.    No murmur heard.  Pulmonary/Chest: Effort normal and breath sounds normal. No respiratory distress. She has no wheezes.   Abdominal: Soft. Bowel sounds are normal. There is no tenderness. There is no rebound and no guarding.   Musculoskeletal: Normal range of motion. She exhibits no edema, tenderness or deformity.   Neurological: She is alert and oriented to person, place, and time. No cranial nerve deficit.   Skin: Skin is warm and dry. No rash noted.   Psychiatric: She has a normal mood and affect. Her behavior is normal. Judgment and thought content normal.       Lab Review  Glucose   Date Value   02/13/2018 124 mg/dL " (H)   10/19/2017 175 mg/dL (H)   12/22/2016 234 mg/dl (H)     Sodium (mmol/L)   Date Value   02/13/2018 141   10/19/2017 140   12/22/2016 137     Potassium (mmol/L)   Date Value   02/13/2018 4.1   10/19/2017 4.4   12/22/2016 4.6     Chloride (mmol/L)   Date Value   02/13/2018 102   10/19/2017 102   12/22/2016 97     CO2 (mmol/L)   Date Value   02/13/2018 24.0   10/19/2017 21.0 (L)   12/22/2016 26     BUN   Date Value   02/13/2018 12 mg/dL   10/19/2017 13 mg/dL   12/22/2016 15 mg/dl     Creatinine   Date Value   02/13/2018 0.54 mg/dL   10/19/2017 0.69 mg/dL   12/22/2016 0.7 mg/dl     Hemoglobin A1C   Date Value   02/13/2018 9.3 % (H)   10/19/2017 8.3 % (H)   12/22/2016 10.3 %TotHgb (H)     Triglycerides   Date Value   10/19/2017 147 mg/dL   12/22/2016 205 mg/dl (H)     LDL Cholesterol    Date Value   10/19/2017 98 mg/dL   12/22/2016 161 mg/dl (H)       Assessment/Plan      1. Type 2 diabetes mellitus without complication, unspecified long term insulin use status    2. Mixed hyperlipidemia due to type 2 diabetes mellitus    3. Hypertension associated with diabetes    4. Vitamin D deficiency    .    Medications prescribed:  Outpatient Encounter Prescriptions as of 2/19/2018   Medication Sig Dispense Refill   • aspirin 81 MG chewable tablet Chew 81 mg daily.     • benzonatate (TESSALON PERLES) 100 MG capsule Take 1 capsule by mouth 3 (Three) Times a Day As Needed for Cough. 90 capsule 0   • Canagliflozin 100 MG tablet One tablet daily by mouth before breakfast 30 tablet 11   • glucose blood (DMITRI CONTOUR NEXT TEST) test strip Use as instructed 4 x daily 200 each 12   • Insulin Glargine-Lixisenatide (SOLIQUA) 100-33 UNT-MCG/ML solution pen-injector Inject 50 Units under the skin Daily.     • Insulin Lispro (HUMALOG) 100 UNIT/ML solution pen-injector 25 units with meals 9 pen 11   • Insulin Pen Needle (B-D UF III MINI PEN NEEDLES) 31G X 5 MM misc Use 4 times daily 120 each 11   • lisinopril (PRINIVIL,ZESTRIL) 5 MG tablet    0   • lovastatin (MEVACOR) 40 MG tablet   0   • rosuvastatin (CRESTOR) 20 MG tablet Take 1 tablet by mouth Daily. 30 tablet 11     No facility-administered encounter medications on file as of 2/19/2018.        Orders placed during this encounter include:  Orders Placed This Encounter   Procedures   • POC Glucose Fingerstick     T                          Type 2 Diabetes Mellitus     Lab Results   Component Value Date    HGBA1C 9.3 (H) 02/13/2018    HGBA1C 8.3 (H) 10/19/2017    HGBA1C 10.3 (H) 12/22/2016     Lab Results   Component Value Date    MICROALBUR 1.2 10/19/2017    CREATININE 0.54 02/13/2018     Lab Results   Component Value Date    GLUCOSE 124 (H) 02/13/2018    CALCIUM 9.7 02/13/2018     02/13/2018    K 4.1 02/13/2018    CO2 24.0 02/13/2018     02/13/2018    BUN 12 02/13/2018    CREATININE 0.54 02/13/2018    EGFRIFNONA 114 (H) 02/13/2018    BCR 22.2 02/13/2018    ANIONGAP 15.0 02/13/2018          Metformin , can't take , diarrhea , even ER      Soliqua - 40 units --- increase  45 units      Humalog      3 units per carb-- 4 units per carb      and 2 per 50       invokana 100 mg daily     Scarring , use afrezza instead      ---     Has had pneumovax before age 65  Flu Vaccine in 2016  --     Microvascular Monitoring     2015 , no  December  Joyce neuropathy      Morbid obesity due to excess calories  Obesity is newly identified.  Discussed the patient's BMI. The BMI is above average; BMI management plan is completed.  General weight loss/lifestyle modification strategies discussed (elicit support from others; identify saboteurs; non-food rewards, etc).  Diet interventions: moderate (500 kCal/d) deficit diet.            Mixed hyperlipidemia due to type 2 diabetes mellitus  On mevacor 40 mg qhs, not working, change to crestor 20 mg at night         Total Cholesterol   Date Value Ref Range Status   10/19/2017 172 0 - 199 mg/dL Final     Triglycerides   Date Value Ref Range Status   10/19/2017 147  "20 - 199 mg/dL Final     HDL Cholesterol   Date Value Ref Range Status   10/19/2017 53 (L) 60 - 200 mg/dL Final       Lab Results   Component Value Date    LDL 98 10/19/2017   \"                              Hypertension associated with diabetes  Hypertension is newly identified.  Continue current treatment regimen.  Dietary sodium restriction.  Weight loss.  Regular aerobic exercise.  Continue current medications.        On lisinopril 5 mg daily   due to sickness      Vitamin d def.    Vitamin d 20     Start taking vitamin d     Bronchitis                            4. Follow-up: No Follow-up on file.                   "

## 2018-04-03 ENCOUNTER — TELEPHONE (OUTPATIENT)
Dept: FAMILY MEDICINE CLINIC | Facility: CLINIC | Age: 63
End: 2018-04-03

## 2018-04-13 ENCOUNTER — TELEPHONE (OUTPATIENT)
Dept: FAMILY MEDICINE CLINIC | Facility: CLINIC | Age: 63
End: 2018-04-13

## 2018-04-17 ENCOUNTER — TELEPHONE (OUTPATIENT)
Dept: ENDOCRINOLOGY | Facility: CLINIC | Age: 63
End: 2018-04-17

## 2018-04-17 NOTE — TELEPHONE ENCOUNTER
Daphnie Jay  Female, 63 y.o., 1955  PCP:   Benito Chand MD  Language:   English  Need Interp:   No  Last Weight:   106 kg (234 lb 8 oz)  Phone:   M: [phone]  Allergies  Demerol [Meperidine]  Health Maintenance:   Due  FYI:   None  Primary Ins.:   [facility]  MRN:   [id number]  MyChart:   Active  Pharmacy:   [facility] - [place], [place] - [address] - [phone]  - [phone] FX [[id number]] [facility] - [place], [place] - [address] - [phone] PH - [phone] FX [[id number]] [id number] [place], [place] - [address] AT [address] - [phone] PH - [phone] FX [[id number]] [facility] - [place] [address] AT [address]AL TO [facility] [address] - [phone] PH  [phone] FX [[id number]] [facility] - [facility] [address] - [phone]  - [phone] FX [[id number]]  Preferred Lab:   None  Next Appt with Me:   None  Next Appt Date by Dept:   08/20/2018     Patient Calls     Rachna Juarez 14 minutes ago (9:05 AM)      Daphnie is needing Invokana 100mg and written for 90 days with refills sent to [facility], also could you please give Daphnie a call when you get a chance. (Routing comment)       Daphnie Jay [phone]   Rachna Stewart 16 minutes ago (9:03 AM)      Incoming call:  Rx sent to [facility]

## 2018-12-17 ENCOUNTER — APPOINTMENT (OUTPATIENT)
Dept: LAB | Facility: HOSPITAL | Age: 63
End: 2018-12-17

## 2018-12-17 ENCOUNTER — OFFICE VISIT (OUTPATIENT)
Dept: ENDOCRINOLOGY | Facility: CLINIC | Age: 63
End: 2018-12-17

## 2018-12-17 VITALS
HEART RATE: 94 BPM | BODY MASS INDEX: 43.77 KG/M2 | WEIGHT: 247 LBS | SYSTOLIC BLOOD PRESSURE: 124 MMHG | HEIGHT: 63 IN | DIASTOLIC BLOOD PRESSURE: 82 MMHG

## 2018-12-17 DIAGNOSIS — I15.2 HYPERTENSION ASSOCIATED WITH DIABETES (HCC): ICD-10-CM

## 2018-12-17 DIAGNOSIS — Z79.4 TYPE 2 DIABETES MELLITUS WITHOUT COMPLICATION, WITH LONG-TERM CURRENT USE OF INSULIN (HCC): ICD-10-CM

## 2018-12-17 DIAGNOSIS — E55.9 VITAMIN D DEFICIENCY: ICD-10-CM

## 2018-12-17 DIAGNOSIS — Z79.4 TYPE 2 DIABETES MELLITUS WITHOUT COMPLICATION, WITH LONG-TERM CURRENT USE OF INSULIN (HCC): Primary | ICD-10-CM

## 2018-12-17 DIAGNOSIS — E11.9 TYPE 2 DIABETES MELLITUS WITHOUT COMPLICATION, WITH LONG-TERM CURRENT USE OF INSULIN (HCC): Primary | ICD-10-CM

## 2018-12-17 DIAGNOSIS — E11.69 MIXED HYPERLIPIDEMIA DUE TO TYPE 2 DIABETES MELLITUS (HCC): ICD-10-CM

## 2018-12-17 DIAGNOSIS — E78.2 MIXED HYPERLIPIDEMIA DUE TO TYPE 2 DIABETES MELLITUS (HCC): ICD-10-CM

## 2018-12-17 DIAGNOSIS — E11.59 HYPERTENSION ASSOCIATED WITH DIABETES (HCC): ICD-10-CM

## 2018-12-17 DIAGNOSIS — E11.9 TYPE 2 DIABETES MELLITUS WITHOUT COMPLICATION, WITH LONG-TERM CURRENT USE OF INSULIN (HCC): ICD-10-CM

## 2018-12-17 LAB
25(OH)D3 SERPL-MCNC: 30.9 NG/ML (ref 30–100)
ALBUMIN SERPL-MCNC: 4.9 G/DL (ref 3.4–4.8)
ALBUMIN/GLOB SERPL: 1.4 G/DL (ref 1.1–1.8)
ALP SERPL-CCNC: 73 U/L (ref 38–126)
ALT SERPL W P-5'-P-CCNC: 57 U/L (ref 9–52)
ANION GAP SERPL CALCULATED.3IONS-SCNC: 14 MMOL/L (ref 5–15)
AST SERPL-CCNC: 59 U/L (ref 14–36)
BASOPHILS # BLD AUTO: 0.02 10*3/MM3 (ref 0–0.2)
BASOPHILS NFR BLD AUTO: 0.3 % (ref 0–2)
BILIRUB SERPL-MCNC: 1.3 MG/DL (ref 0.2–1.3)
BUN BLD-MCNC: 15 MG/DL (ref 7–21)
BUN/CREAT SERPL: 20.8 (ref 7–25)
CALCIUM SPEC-SCNC: 10 MG/DL (ref 8.4–10.2)
CHLORIDE SERPL-SCNC: 101 MMOL/L (ref 95–110)
CO2 SERPL-SCNC: 22 MMOL/L (ref 22–31)
CREAT BLD-MCNC: 0.72 MG/DL (ref 0.5–1)
DEPRECATED RDW RBC AUTO: 39.1 FL (ref 36.4–46.3)
EOSINOPHIL # BLD AUTO: 0.11 10*3/MM3 (ref 0–0.7)
EOSINOPHIL NFR BLD AUTO: 1.5 % (ref 0–7)
ERYTHROCYTE [DISTWIDTH] IN BLOOD BY AUTOMATED COUNT: 12.1 % (ref 11.5–14.5)
GFR SERPL CREATININE-BSD FRML MDRD: 82 ML/MIN/1.73 (ref 45–104)
GLOBULIN UR ELPH-MCNC: 3.4 GM/DL (ref 2.3–3.5)
GLUCOSE BLD-MCNC: 216 MG/DL (ref 60–100)
HBA1C MFR BLD: 9 % (ref 4–5.6)
HCT VFR BLD AUTO: 47.8 % (ref 35–45)
HGB BLD-MCNC: 16.8 G/DL (ref 12–15.5)
IMM GRANULOCYTES # BLD: 0.01 10*3/MM3 (ref 0–0.02)
IMM GRANULOCYTES NFR BLD: 0.1 % (ref 0–0.5)
LYMPHOCYTES # BLD AUTO: 2.33 10*3/MM3 (ref 0.6–4.2)
LYMPHOCYTES NFR BLD AUTO: 31.9 % (ref 10–50)
MCH RBC QN AUTO: 31.5 PG (ref 26.5–34)
MCHC RBC AUTO-ENTMCNC: 35.1 G/DL (ref 31.4–36)
MCV RBC AUTO: 89.7 FL (ref 80–98)
MONOCYTES # BLD AUTO: 0.46 10*3/MM3 (ref 0–0.9)
MONOCYTES NFR BLD AUTO: 6.3 % (ref 0–12)
NEUTROPHILS # BLD AUTO: 4.38 10*3/MM3 (ref 2–8.6)
NEUTROPHILS NFR BLD AUTO: 59.9 % (ref 37–80)
PLATELET # BLD AUTO: 220 10*3/MM3 (ref 150–450)
PMV BLD AUTO: 9.9 FL (ref 8–12)
POTASSIUM BLD-SCNC: 4.4 MMOL/L (ref 3.5–5.1)
PROT SERPL-MCNC: 8.3 G/DL (ref 6.3–8.6)
RBC # BLD AUTO: 5.33 10*6/MM3 (ref 3.77–5.16)
SODIUM BLD-SCNC: 137 MMOL/L (ref 137–145)
TSH SERPL DL<=0.05 MIU/L-ACNC: 1.13 MIU/ML (ref 0.46–4.68)
WBC NRBC COR # BLD: 7.31 10*3/MM3 (ref 3.2–9.8)

## 2018-12-17 PROCEDURE — 99214 OFFICE O/P EST MOD 30 MIN: CPT | Performed by: NURSE PRACTITIONER

## 2018-12-17 PROCEDURE — 85025 COMPLETE CBC W/AUTO DIFF WBC: CPT | Performed by: NURSE PRACTITIONER

## 2018-12-17 PROCEDURE — 80053 COMPREHEN METABOLIC PANEL: CPT | Performed by: NURSE PRACTITIONER

## 2018-12-17 PROCEDURE — 83036 HEMOGLOBIN GLYCOSYLATED A1C: CPT | Performed by: NURSE PRACTITIONER

## 2018-12-17 PROCEDURE — 84443 ASSAY THYROID STIM HORMONE: CPT | Performed by: NURSE PRACTITIONER

## 2018-12-17 PROCEDURE — 36415 COLL VENOUS BLD VENIPUNCTURE: CPT | Performed by: NURSE PRACTITIONER

## 2018-12-17 PROCEDURE — 82306 VITAMIN D 25 HYDROXY: CPT | Performed by: NURSE PRACTITIONER

## 2018-12-17 NOTE — PROGRESS NOTES
"  Subjective    Daphnie Jay is a 63 y.o. female. she is here today for follow-up.    History of Present Illness       REASON - Diabetes     Duration 10 years      Timing - Diabetes is Constant     Quality - Uncontrolled - better controlled      Severity - moderate     Complications - none     Current symptoms/problems  polydipsia and polyuria - no longer      Alleviating Factors: Compliance      Side Effects none     Current diet  in general, an \"unhealthy\" diet - improved diet      Current exercise none     Current monitoring regimen: home blood tests - 3 times daily      Home blood sugar records:     140 up to 180     Some 110     No lows sugars      Hypoglycemia none       The following portions of the patient's history were reviewed and updated as appropriate:   Past Medical History:   Diagnosis Date   • Type 2 diabetes mellitus without complication (CMS/[facility]) 8/20/2016     Past Surgical History:   Procedure Laterality Date   • FRACTURE SURGERY Left      Family History   Problem Relation Age of Onset   • Diabetes Mother      OB History     No data available        Current Outpatient Medications   Medication Sig Dispense Refill   • aspirin 81 MG chewable tablet Chew 81 mg daily.     • Canagliflozin 100 MG tablet One tablet daily by mouth before breakfast 90 tablet 3   • glucose blood (DMITRI CONTOUR NEXT TEST) test strip Use as instructed 4 x daily 200 each 12   • Insulin Glargine-Lixisenatide 100-33 UNT-MCG/ML solution pen-injector Inject 50 Units under the skin Daily. 15 pen 3   • Insulin Lispro (HUMALOG) 100 UNIT/ML solution pen-injector 25 units with meals 9 pen 11   • Insulin Pen Needle (B-D UF III MINI PEN NEEDLES) 31G X 5 MM misc Use 4 times daily 360 each 3   • lisinopril (PRINIVIL,ZESTRIL) 5 MG tablet   0   • lovastatin (MEVACOR) 40 MG tablet   0   • rosuvastatin (CRESTOR) 20 MG tablet Take 1 tablet by mouth Daily. 30 tablet 11     No current facility-administered medications for this visit.  " "    Allergies   Allergen Reactions   • Demerol [Meperidine] Shortness Of Breath     Social History     Socioeconomic History   • Marital status:      Spouse name: Not on file   • Number of children: Not on file   • Years of education: Not on file   • Highest education level: Not on file   Tobacco Use   • Smoking status: Never Smoker   • Smokeless tobacco: Never Used   Substance and Sexual Activity   • Alcohol use: No       Review of Systems  Review of Systems   Constitutional: Negative for activity change, appetite change, diaphoresis and fatigue.   HENT: Negative for facial swelling, sneezing, sore throat, tinnitus, trouble swallowing and voice change.    Eyes: Negative for photophobia, pain, discharge, redness, itching and visual disturbance.   Respiratory: Negative for apnea, cough, choking, chest tightness and shortness of breath.    Cardiovascular: Negative for chest pain, palpitations and leg swelling.   Gastrointestinal: Negative for abdominal distention, abdominal pain, constipation, diarrhea, nausea and vomiting.   Endocrine: Negative for cold intolerance, heat intolerance, polydipsia, polyphagia and polyuria.   Genitourinary: Negative for difficulty urinating, dysuria, frequency, hematuria and urgency.   Musculoskeletal: Negative for arthralgias, back pain, gait problem, joint swelling, myalgias, neck pain and neck stiffness.   Skin: Negative for color change, pallor, rash and wound.   Neurological: Negative for dizziness, tremors, weakness, light-headedness, numbness and headaches.   Hematological: Negative for adenopathy. Does not bruise/bleed easily.   Psychiatric/Behavioral: Negative for behavioral problems, confusion and sleep disturbance.        Objective    /82 (BP Location: Right arm, Patient Position: Sitting, Cuff Size: Adult)   Pulse 94   Ht 160 cm (63\")   Wt 112 kg (247 lb)   BMI 43.75 kg/m²   Physical Exam   Constitutional: She is oriented to person, place, and time. She " appears well-developed and well-nourished. No distress.   HENT:   Head: Normocephalic and atraumatic.   Right Ear: External ear normal.   Left Ear: External ear normal.   Nose: Nose normal.   Eyes: Conjunctivae and EOM are normal. Pupils are equal, round, and reactive to light.   Neck: Normal range of motion. Neck supple. No tracheal deviation present. No thyromegaly present.   Cardiovascular: Normal rate, regular rhythm and normal heart sounds.   No murmur heard.  Pulmonary/Chest: Effort normal and breath sounds normal. No respiratory distress. She has no wheezes.   Abdominal: Soft. Bowel sounds are normal. There is no tenderness. There is no rebound and no guarding.   Musculoskeletal: Normal range of motion. She exhibits no edema, tenderness or deformity.    Daphnie had a diabetic foot exam performed today.   During the foot exam she had a monofilament test performed.    Neurological Sensory Findings -  Right ankle/foot altered proprioception and left ankle/foot altered proprioception.  Vascular Status -  Her right foot exhibits no edema. Her left foot exhibits no edema.  Skin Integrity  -  She has right heel is dry and cracked.She has left heel dry and cracked..  Neurological: She is alert and oriented to person, place, and time. No cranial nerve deficit.   Skin: Skin is warm and dry. No rash noted.   Psychiatric: She has a normal mood and affect. Her behavior is normal. Judgment and thought content normal.       Lab Review  Glucose   Date Value   02/13/2018 124 mg/dL (H)   10/19/2017 175 mg/dL (H)   12/22/2016 234 mg/dl (H)     Sodium (mmol/L)   Date Value   02/13/2018 141   10/19/2017 140   12/22/2016 137     Potassium (mmol/L)   Date Value   02/13/2018 4.1   10/19/2017 4.4   12/22/2016 4.6     Chloride (mmol/L)   Date Value   02/13/2018 102   10/19/2017 102   12/22/2016 97     CO2 (mmol/L)   Date Value   02/13/2018 24.0   10/19/2017 21.0 (L)   12/22/2016 26     BUN   Date Value   02/13/2018 12 mg/dL   10/19/2017  13 mg/dL   12/22/2016 15 mg/dl     Creatinine   Date Value   02/13/2018 0.54 mg/dL   10/19/2017 0.69 mg/dL   12/22/2016 0.7 mg/dl     Hemoglobin A1C   Date Value   02/13/2018 9.3 % (H)   10/19/2017 8.3 % (H)   12/22/2016 10.3 %TotHgb (H)     Triglycerides   Date Value   10/19/2017 147 mg/dL   12/22/2016 205 mg/dl (H)     LDL Cholesterol    Date Value   10/19/2017 98 mg/dL   12/22/2016 161 mg/dl (H)       Assessment/Plan      1. Type 2 diabetes mellitus without complication, with long-term current use of insulin (CMS/[facility])    2. Mixed hyperlipidemia due to type 2 diabetes mellitus (CMS/[facility])    3. Hypertension associated with diabetes (CMS/[facility])    4. Vitamin D deficiency    .    Medications prescribed:  Outpatient Encounter Medications as of 12/17/2018   Medication Sig Dispense Refill   • aspirin 81 MG chewable tablet Chew 81 mg daily.     • Canagliflozin 100 MG tablet One tablet daily by mouth before breakfast 90 tablet 3   • glucose blood (DMITRI CONTOUR NEXT TEST) test strip Use as instructed 4 x daily 200 each 12   • Insulin Glargine-Lixisenatide 100-33 UNT-MCG/ML solution pen-injector Inject 50 Units under the skin Daily. 15 pen 3   • Insulin Lispro (HUMALOG) 100 UNIT/ML solution pen-injector 25 units with meals 9 pen 11   • Insulin Pen Needle (B-D UF III MINI PEN NEEDLES) 31G X 5 MM misc Use 4 times daily 360 each 3   • lisinopril (PRINIVIL,ZESTRIL) 5 MG tablet   0   • lovastatin (MEVACOR) 40 MG tablet   0   • rosuvastatin (CRESTOR) 20 MG tablet Take 1 tablet by mouth Daily. 30 tablet 11   • [DISCONTINUED] Continuous Blood Gluc Sensor (Alive JuicesYLE PEG SENSOR SYSTEM) misc Inject 1 each under the skin As Needed (every 10 days). NDC [address]-50,[facility]: 57986704,CARDINAL: [id number],[facility]: [id number] 3 each 11   • [DISCONTINUED] Insulin Regular Human 4 (90) & 8 (90) units powder Inhale 12-24 Units 3 (Three) Times a Day With Meals. NDC code 24234-166-86 180 each 11     No facility-administered encounter medications on  "file as of 12/17/2018.        Orders placed during this encounter include:  Orders Placed This Encounter   Procedures   • Comprehensive Metabolic Panel   • Hemoglobin A1c   • Vitamin D 25 Hydroxy   • TSH   • CBC & Differential     Order Specific Question:   Manual Differential     Answer:   No      Type 2 Diabetes Mellitus         Lab Results   Component Value Date    HGBA1C 9.3 (H) 02/13/2018          Metformin , can't take , diarrhea , even ER      Soliqua - 50 units      Humalog       3 units per carb      and 2 per 50       invokana 100 mg daily      Scarring , use afrezza instead        ---     Has had pneumovax before age 65  Flu Vaccine in 2016  --     Microvascular Monitoring     Yearly eye exam     Neuropathy-- bilateral feet     No medication requested at this time             Morbid obesity due to excess calories        Patient's Body mass index is 43.75 kg/m². BMI is above normal parameters. Recommendations include: educational material.    Discussed the patient's BMI. The BMI is above average; BMI management plan is completed.  General weight loss/lifestyle modification strategies discussed (elicit support from others; identify saboteurs; non-food rewards, etc).  Diet interventions: moderate (500 kCal/d) deficit diet.            Mixed hyperlipidemia due to type 2 diabetes mellitus      On mevacor 40 mg qhs, not working, change to crestor 20 mg at night                Total Cholesterol   Date Value Ref Range Status   10/19/2017 172 0 - 199 mg/dL Final            Triglycerides   Date Value Ref Range Status   10/19/2017 147 20 - 199 mg/dL Final            HDL Cholesterol   Date Value Ref Range Status   10/19/2017 53 (L) 60 - 200 mg/dL Final               Lab Results   Component Value Date     LDL 98 10/19/2017   \"                                           Hypertension associated with diabetes  Hypertension is newly identified.  Continue current treatment regimen.  Dietary sodium restriction.  Weight " loss.  Regular aerobic exercise.  Continue current medications.        On lisinopril 5 mg daily   due to sickness        Vitamin d def.     Vitamin d 20      Start taking vitamin d                  4. Follow-up: Return in about 3 months (around 3/17/2019) for Recheck.

## 2018-12-18 ENCOUNTER — TELEPHONE (OUTPATIENT)
Dept: ENDOCRINOLOGY | Facility: CLINIC | Age: 63
End: 2018-12-18

## 2018-12-18 NOTE — TELEPHONE ENCOUNTER
----- Message from MURALI Bettencourt sent at 12/18/2018  7:54 AM CST -----  A1c was 9% so average is 210 it was 9.3 previously

## 2019-06-28 ENCOUNTER — TELEPHONE (OUTPATIENT)
Dept: ENDOCRINOLOGY | Facility: CLINIC | Age: 64
End: 2019-06-28

## 2019-10-30 ENCOUNTER — TELEPHONE (OUTPATIENT)
Dept: FAMILY MEDICINE CLINIC | Facility: CLINIC | Age: 64
End: 2019-10-30

## 2019-10-30 DIAGNOSIS — E55.9 VITAMIN D DEFICIENCY: ICD-10-CM

## 2019-10-30 DIAGNOSIS — Z79.4 TYPE 2 DIABETES MELLITUS WITHOUT COMPLICATION, WITH LONG-TERM CURRENT USE OF INSULIN (HCC): ICD-10-CM

## 2019-10-30 DIAGNOSIS — E11.9 TYPE 2 DIABETES MELLITUS WITHOUT COMPLICATION, WITH LONG-TERM CURRENT USE OF INSULIN (HCC): ICD-10-CM

## 2019-10-30 DIAGNOSIS — E11.69 MIXED HYPERLIPIDEMIA DUE TO TYPE 2 DIABETES MELLITUS (HCC): Primary | ICD-10-CM

## 2019-10-30 DIAGNOSIS — E78.2 MIXED HYPERLIPIDEMIA DUE TO TYPE 2 DIABETES MELLITUS (HCC): Primary | ICD-10-CM

## 2019-10-30 DIAGNOSIS — I15.2 HYPERTENSION ASSOCIATED WITH DIABETES (HCC): ICD-10-CM

## 2019-10-30 DIAGNOSIS — E11.59 HYPERTENSION ASSOCIATED WITH DIABETES (HCC): ICD-10-CM

## 2019-12-10 ENCOUNTER — APPOINTMENT (OUTPATIENT)
Dept: LAB | Facility: HOSPITAL | Age: 64
End: 2019-12-10

## 2019-12-10 LAB
25(OH)D3 SERPL-MCNC: 19.1 NG/ML (ref 30–100)
ALBUMIN SERPL-MCNC: 4.3 G/DL (ref 3.5–5.2)
ALBUMIN/GLOB SERPL: 1.1 G/DL
ALP SERPL-CCNC: 80 U/L (ref 39–117)
ALT SERPL W P-5'-P-CCNC: 80 U/L (ref 1–33)
ANION GAP SERPL CALCULATED.3IONS-SCNC: 20.9 MMOL/L (ref 5–15)
ANISOCYTOSIS BLD QL: ABNORMAL
AST SERPL-CCNC: 56 U/L (ref 1–32)
BASOPHILS # BLD MANUAL: 0.17 10*3/MM3 (ref 0–0.2)
BASOPHILS NFR BLD AUTO: 2 % (ref 0–1.5)
BILIRUB SERPL-MCNC: 1.1 MG/DL (ref 0.2–1.2)
BUN BLD-MCNC: 15 MG/DL (ref 8–23)
BUN/CREAT SERPL: 21.1 (ref 7–25)
CALCIUM SPEC-SCNC: 9.6 MG/DL (ref 8.6–10.5)
CHLORIDE SERPL-SCNC: 102 MMOL/L (ref 98–107)
CHOLEST SERPL-MCNC: 185 MG/DL (ref 0–200)
CO2 SERPL-SCNC: 20.1 MMOL/L (ref 22–29)
CREAT BLD-MCNC: 0.71 MG/DL (ref 0.57–1)
DEPRECATED RDW RBC AUTO: 40.6 FL (ref 37–54)
EOSINOPHIL # BLD MANUAL: 0.17 10*3/MM3 (ref 0–0.4)
EOSINOPHIL NFR BLD MANUAL: 2 % (ref 0.3–6.2)
ERYTHROCYTE [DISTWIDTH] IN BLOOD BY AUTOMATED COUNT: 12.2 % (ref 12.3–15.4)
GFR SERPL CREATININE-BSD FRML MDRD: 83 ML/MIN/1.73
GLOBULIN UR ELPH-MCNC: 3.8 GM/DL
GLUCOSE BLD-MCNC: 196 MG/DL (ref 65–99)
HBA1C MFR BLD: 10.7 % (ref 4.8–5.6)
HCT VFR BLD AUTO: 49.5 % (ref 34–46.6)
HDLC SERPL-MCNC: 44 MG/DL (ref 40–60)
HGB BLD-MCNC: 16.7 G/DL (ref 12–15.9)
LDLC SERPL CALC-MCNC: 101 MG/DL (ref 0–100)
LDLC/HDLC SERPL: 2.3 {RATIO}
LYMPHOCYTES # BLD MANUAL: 3.88 10*3/MM3 (ref 0.7–3.1)
LYMPHOCYTES NFR BLD MANUAL: 4 % (ref 5–12)
LYMPHOCYTES NFR BLD MANUAL: 46 % (ref 19.6–45.3)
MCH RBC QN AUTO: 30.9 PG (ref 26.6–33)
MCHC RBC AUTO-ENTMCNC: 33.7 G/DL (ref 31.5–35.7)
MCV RBC AUTO: 91.7 FL (ref 79–97)
MICROCYTES BLD QL: ABNORMAL
MONOCYTES # BLD AUTO: 0.34 10*3/MM3 (ref 0.1–0.9)
NEUTROPHILS # BLD AUTO: 3.88 10*3/MM3 (ref 1.7–7)
NEUTROPHILS NFR BLD MANUAL: 46 % (ref 42.7–76)
PLAT MORPH BLD: NORMAL
PLATELET # BLD AUTO: 234 10*3/MM3 (ref 140–450)
PMV BLD AUTO: 10.1 FL (ref 6–12)
POTASSIUM BLD-SCNC: 4 MMOL/L (ref 3.5–5.2)
PROT SERPL-MCNC: 8.1 G/DL (ref 6–8.5)
RBC # BLD AUTO: 5.4 10*6/MM3 (ref 3.77–5.28)
SMUDGE CELLS BLD QL SMEAR: ABNORMAL
SODIUM BLD-SCNC: 143 MMOL/L (ref 136–145)
TRIGL SERPL-MCNC: 198 MG/DL (ref 0–150)
TSH SERPL DL<=0.05 MIU/L-ACNC: 3.66 UIU/ML (ref 0.27–4.2)
VIT B12 BLD-MCNC: 705 PG/ML (ref 211–946)
VLDLC SERPL-MCNC: 39.6 MG/DL (ref 5–40)
WBC NRBC COR # BLD: 8.44 10*3/MM3 (ref 3.4–10.8)

## 2019-12-10 PROCEDURE — 82607 VITAMIN B-12: CPT | Performed by: NURSE PRACTITIONER

## 2019-12-10 PROCEDURE — 84443 ASSAY THYROID STIM HORMONE: CPT | Performed by: NURSE PRACTITIONER

## 2019-12-10 PROCEDURE — 80053 COMPREHEN METABOLIC PANEL: CPT | Performed by: NURSE PRACTITIONER

## 2019-12-10 PROCEDURE — 82043 UR ALBUMIN QUANTITATIVE: CPT | Performed by: NURSE PRACTITIONER

## 2019-12-10 PROCEDURE — 84156 ASSAY OF PROTEIN URINE: CPT | Performed by: NURSE PRACTITIONER

## 2019-12-10 PROCEDURE — 83036 HEMOGLOBIN GLYCOSYLATED A1C: CPT | Performed by: NURSE PRACTITIONER

## 2019-12-10 PROCEDURE — 85025 COMPLETE CBC W/AUTO DIFF WBC: CPT | Performed by: NURSE PRACTITIONER

## 2019-12-10 PROCEDURE — 85007 BL SMEAR W/DIFF WBC COUNT: CPT | Performed by: NURSE PRACTITIONER

## 2019-12-10 PROCEDURE — 80061 LIPID PANEL: CPT | Performed by: NURSE PRACTITIONER

## 2019-12-10 PROCEDURE — 82306 VITAMIN D 25 HYDROXY: CPT | Performed by: NURSE PRACTITIONER

## 2019-12-10 PROCEDURE — 82570 ASSAY OF URINE CREATININE: CPT | Performed by: NURSE PRACTITIONER

## 2019-12-11 ENCOUNTER — TELEPHONE (OUTPATIENT)
Dept: ENDOCRINOLOGY | Facility: CLINIC | Age: 64
End: 2019-12-11

## 2019-12-11 ENCOUNTER — OFFICE VISIT (OUTPATIENT)
Dept: ENDOCRINOLOGY | Facility: CLINIC | Age: 64
End: 2019-12-11

## 2019-12-11 VITALS
OXYGEN SATURATION: 98 % | HEART RATE: 85 BPM | DIASTOLIC BLOOD PRESSURE: 74 MMHG | HEIGHT: 63 IN | BODY MASS INDEX: 44.3 KG/M2 | WEIGHT: 250 LBS | SYSTOLIC BLOOD PRESSURE: 138 MMHG

## 2019-12-11 DIAGNOSIS — E11.59 HYPERTENSION ASSOCIATED WITH DIABETES (HCC): ICD-10-CM

## 2019-12-11 DIAGNOSIS — E11.9 TYPE 2 DIABETES MELLITUS WITHOUT COMPLICATION, WITH LONG-TERM CURRENT USE OF INSULIN (HCC): Primary | ICD-10-CM

## 2019-12-11 DIAGNOSIS — E11.69 MIXED HYPERLIPIDEMIA DUE TO TYPE 2 DIABETES MELLITUS (HCC): ICD-10-CM

## 2019-12-11 DIAGNOSIS — E78.2 MIXED HYPERLIPIDEMIA DUE TO TYPE 2 DIABETES MELLITUS (HCC): ICD-10-CM

## 2019-12-11 DIAGNOSIS — E55.9 VITAMIN D DEFICIENCY: ICD-10-CM

## 2019-12-11 DIAGNOSIS — Z79.4 TYPE 2 DIABETES MELLITUS WITHOUT COMPLICATION, WITH LONG-TERM CURRENT USE OF INSULIN (HCC): Primary | ICD-10-CM

## 2019-12-11 DIAGNOSIS — I15.2 HYPERTENSION ASSOCIATED WITH DIABETES (HCC): ICD-10-CM

## 2019-12-11 LAB
ALBUMIN UR-MCNC: 22.7 MG/DL
CREAT UR-MCNC: 275.3 MG/DL
CREAT UR-MCNC: 275.3 MG/DL
MICROALBUMIN/CREAT UR: 82.5 MG/G
PROT UR-MCNC: 56 MG/DL
PROT/CREAT UR: 203.4 MG/G CREA (ref 0–200)

## 2019-12-11 PROCEDURE — 99214 OFFICE O/P EST MOD 30 MIN: CPT | Performed by: NURSE PRACTITIONER

## 2020-01-24 ENCOUNTER — TELEPHONE (OUTPATIENT)
Dept: ENDOCRINOLOGY | Facility: CLINIC | Age: 65
End: 2020-01-24

## 2020-01-24 NOTE — TELEPHONE ENCOUNTER
JF ERAZO Key: [id number] - Rx #: [id number] Need help? Call us at [phone]   Status   Sent to [employer] 100-33UNT-MCG/ML pen-injectors   [employer] Commercial Electronic PA Form   Original Claim [id number] ~~~~~~&~~~~~~~~~~<&~~~~~~~~~~<~~ ~~<~

## 2020-04-01 ENCOUNTER — TELEPHONE (OUTPATIENT)
Dept: ENDOCRINOLOGY | Facility: CLINIC | Age: 65
End: 2020-04-01

## 2020-04-01 NOTE — TELEPHONE ENCOUNTER
Pt called and said she needs a new prescription of her [facility]

## 2020-05-27 ENCOUNTER — OFFICE VISIT (OUTPATIENT)
Dept: ENDOCRINOLOGY | Facility: CLINIC | Age: 65
End: 2020-05-27

## 2020-05-27 VITALS
SYSTOLIC BLOOD PRESSURE: 142 MMHG | HEIGHT: 63 IN | HEART RATE: 89 BPM | DIASTOLIC BLOOD PRESSURE: 84 MMHG | BODY MASS INDEX: 44.76 KG/M2 | WEIGHT: 252.6 LBS

## 2020-05-27 DIAGNOSIS — E11.59 HYPERTENSION ASSOCIATED WITH DIABETES (HCC): ICD-10-CM

## 2020-05-27 DIAGNOSIS — E11.69 MIXED HYPERLIPIDEMIA DUE TO TYPE 2 DIABETES MELLITUS (HCC): ICD-10-CM

## 2020-05-27 DIAGNOSIS — I15.2 HYPERTENSION ASSOCIATED WITH DIABETES (HCC): ICD-10-CM

## 2020-05-27 DIAGNOSIS — E11.65 TYPE 2 DIABETES MELLITUS WITH HYPERGLYCEMIA, WITH LONG-TERM CURRENT USE OF INSULIN (HCC): Primary | ICD-10-CM

## 2020-05-27 DIAGNOSIS — E78.2 MIXED HYPERLIPIDEMIA DUE TO TYPE 2 DIABETES MELLITUS (HCC): ICD-10-CM

## 2020-05-27 DIAGNOSIS — E55.9 VITAMIN D DEFICIENCY: ICD-10-CM

## 2020-05-27 DIAGNOSIS — Z79.4 TYPE 2 DIABETES MELLITUS WITH HYPERGLYCEMIA, WITH LONG-TERM CURRENT USE OF INSULIN (HCC): Primary | ICD-10-CM

## 2020-05-27 PROBLEM — M89.9 DISORDER OF BONE: Status: ACTIVE | Noted: 2017-10-05

## 2020-05-27 PROBLEM — F41.9 ANXIETY: Status: ACTIVE | Noted: 2020-05-27

## 2020-05-27 PROBLEM — J06.9 ACUTE UPPER RESPIRATORY INFECTION: Status: ACTIVE | Noted: 2020-05-27

## 2020-05-27 PROBLEM — M75.00 ADHESIVE CAPSULITIS OF SHOULDER: Status: ACTIVE | Noted: 2020-05-27

## 2020-05-27 PROBLEM — J40 BRONCHITIS: Status: RESOLVED | Noted: 2017-10-19 | Resolved: 2020-05-27

## 2020-05-27 PROBLEM — E78.5 HYPERLIPIDEMIA: Status: ACTIVE | Noted: 2020-05-27

## 2020-05-27 PROBLEM — N39.0 URINARY TRACT INFECTIOUS DISEASE: Status: ACTIVE | Noted: 2020-05-27

## 2020-05-27 PROBLEM — J32.9 SINUSITIS: Status: ACTIVE | Noted: 2017-10-05

## 2020-05-27 PROBLEM — R30.0 DIFFICULT OR PAINFUL URINATION: Status: ACTIVE | Noted: 2020-05-27

## 2020-05-27 PROBLEM — D75.1 SECONDARY POLYCYTHEMIA: Status: ACTIVE | Noted: 2020-05-27

## 2020-05-27 PROCEDURE — 99214 OFFICE O/P EST MOD 30 MIN: CPT | Performed by: NURSE PRACTITIONER

## 2020-05-27 NOTE — PROGRESS NOTES
Subjective    Daphnie Jay is a 65 y.o. female. she is here today for follow-up.    History of Present Illness       IN OFFICE VISIT       65 YEAR OLD FEMALE PRESENTS FOR FOLLOW UP     REASON - Diabetes     Duration 10 years      Timing - Diabetes is Constant     Quality - not controlled      Severity - moderate     Complications - none     Current symptoms/problems  polydipsia and polyuria - no longer      Alleviating Factors: Compliance      Side Effects none     Current diet  higher carb      Current exercise none     Current monitoring regimen: home blood tests - 3 times daily              Lab Results   Component Value Date     HGBA1C 10.70 (H) 12/10/2019               Home blood sugar records:         143 today before lunch      States running 200     Some lowest 100        Hypoglycemia none               The following portions of the patient's history were reviewed and updated as appropriate:   Past Medical History:   Diagnosis Date   • Type 2 diabetes mellitus without complication (CMS/HCC) 8/20/2016     Past Surgical History:   Procedure Laterality Date   • FRACTURE SURGERY Left      Family History   Problem Relation Age of Onset   • Diabetes Mother      OB History    None       Current Outpatient Medications   Medication Sig Dispense Refill   • Canagliflozin (Invokana) 100 MG tablet TAKE 1 TABLET BEFORE BREAKFAST. 30 tablet 0   • glucose blood test strip One touch ultra blue test strips to test 4 times a day  Dx-e11.9 450 each 3   • insulin aspart (NOVOLOG FLEXPEN) 100 UNIT/ML solution pen-injector sc pen Injecting 30 up to 50 TID for meals 7 pen 11   • Insulin Glargine-Lixisenatide (SOLIQUA) 100-33 UNT-MCG/ML solution pen-injector injection Inject 60 Units under the skin into the appropriate area as directed Daily. 15 pen 11   • Insulin Pen Needle (B-D UF III MINI PEN NEEDLES) 31G X 5 MM misc Use 4 times daily  Dx-e11.9 360 each 3   • rosuvastatin (CRESTOR) 20 MG tablet Take 1 tablet by mouth Daily.  "30 tablet 11     No current facility-administered medications for this visit.      No Known Allergies  Social History     Socioeconomic History   • Marital status:      Spouse name: Not on file   • Number of children: Not on file   • Years of education: Not on file   • Highest education level: Not on file   Tobacco Use   • Smoking status: Never Smoker   • Smokeless tobacco: Never Used   Substance and Sexual Activity   • Alcohol use: No       Review of Systems  Review of Systems   Constitutional: Negative for activity change, appetite change, diaphoresis and fatigue.   HENT: Negative for facial swelling, sneezing, sore throat, tinnitus, trouble swallowing and voice change.    Eyes: Negative for photophobia, pain, discharge, redness, itching and visual disturbance.   Respiratory: Negative for apnea, cough, choking, chest tightness and shortness of breath.    Cardiovascular: Negative for chest pain, palpitations and leg swelling.   Gastrointestinal: Negative for abdominal distention, abdominal pain, constipation, diarrhea, nausea and vomiting.   Endocrine: Negative for cold intolerance, heat intolerance, polydipsia, polyphagia and polyuria.   Genitourinary: Negative for difficulty urinating, dysuria, frequency, hematuria and urgency.   Musculoskeletal: Negative for arthralgias, back pain, gait problem, joint swelling, myalgias, neck pain and neck stiffness.   Skin: Negative for color change, pallor, rash and wound.   Neurological: Negative for dizziness, tremors, weakness, light-headedness, numbness and headaches.   Hematological: Negative for adenopathy. Does not bruise/bleed easily.   Psychiatric/Behavioral: Negative for behavioral problems, confusion and sleep disturbance.        Objective    /84 (BP Location: Left arm)   Pulse 89   Ht 160 cm (63\")   Wt 115 kg (252 lb 9.6 oz)   BMI 44.75 kg/m²   Physical Exam   Constitutional: She is oriented to person, place, and time. She appears well-developed " and well-nourished. No distress.   HENT:   Head: Normocephalic and atraumatic.   Right Ear: External ear normal.   Left Ear: External ear normal.   Nose: Nose normal.   Eyes: Pupils are equal, round, and reactive to light. Conjunctivae and EOM are normal.   Neck: Normal range of motion. Neck supple. No tracheal deviation present. No thyromegaly present.   Cardiovascular: Normal rate, regular rhythm and normal heart sounds.   No murmur heard.  Pulmonary/Chest: Effort normal and breath sounds normal. No respiratory distress. She has no wheezes.   Abdominal: Soft. Bowel sounds are normal. There is no tenderness. There is no rebound and no guarding.   Musculoskeletal: Normal range of motion. She exhibits no edema, tenderness or deformity.      During the foot exam she had a monofilament test performed.    Neurological Sensory Findings -  Right ankle/foot altered proprioception and left ankle/foot altered proprioception.  Vascular Status -  Her right foot exhibits no edema. Her left foot exhibits no edema.  Skin Integrity  -  Her right foot skin is intact.Her left foot skin is intact..  Neurological: She is alert and oriented to person, place, and time. No cranial nerve deficit.   Skin: Skin is warm and dry. No rash noted.   Psychiatric: She has a normal mood and affect. Her behavior is normal. Judgment and thought content normal.       Lab Review  Glucose (mg/dL)   Date Value   12/10/2019 196 (H)   12/17/2018 216 (H)   02/13/2018 124 (H)     Sodium (mmol/L)   Date Value   12/10/2019 143   12/17/2018 137   02/13/2018 141     Potassium (mmol/L)   Date Value   12/10/2019 4.0   12/17/2018 4.4   02/13/2018 4.1     Chloride (mmol/L)   Date Value   12/10/2019 102   12/17/2018 101   02/13/2018 102     CO2 (mmol/L)   Date Value   12/10/2019 20.1 (L)   12/17/2018 22.0   02/13/2018 24.0     BUN (mg/dL)   Date Value   12/10/2019 15   12/17/2018 15   02/13/2018 12     Creatinine (mg/dL)   Date Value   12/10/2019 0.71   12/17/2018  0.72   02/13/2018 0.54     Hemoglobin A1C (%)   Date Value   12/10/2019 10.70 (H)   12/17/2018 9.0 (H)   02/13/2018 9.3 (H)   10/19/2017 8.3 (H)     Triglycerides   Date Value   12/10/2019 198 mg/dL (H)   10/19/2017 147 mg/dL   12/22/2016 205 mg/dl (H)     LDL Cholesterol    Date Value   12/10/2019 101 mg/dL (H)   10/19/2017 98 mg/dL   12/22/2016 161 mg/dl (H)       Assessment/Plan      1. Type 2 diabetes mellitus with hyperglycemia, with long-term current use of insulin (CMS/[facility])    2. Mixed hyperlipidemia due to type 2 diabetes mellitus (CMS/[facility])    3. Hypertension associated with diabetes (CMS/[facility])    4. Vitamin D deficiency    .    Medications prescribed:  Outpatient Encounter Medications as of 5/27/2020   Medication Sig Dispense Refill   • Canagliflozin (Invokana) 100 MG tablet TAKE 1 TABLET BEFORE BREAKFAST. 30 tablet 0   • glucose blood test strip One touch ultra blue test strips to test 4 times a day  Dx-e11.9 450 each 3   • insulin aspart (NOVOLOG FLEXPEN) 100 UNIT/ML solution pen-injector sc pen Injecting 30 up to 50 TID for meals 7 pen 11   • Insulin Glargine-Lixisenatide (SOLIQUA) 100-33 UNT-MCG/ML solution pen-injector injection Inject 60 Units under the skin into the appropriate area as directed Daily. 15 pen 11   • Insulin Pen Needle (B-D UF III MINI PEN NEEDLES) 31G X 5 MM misc Use 4 times daily  Dx-e11.9 360 each 3   • rosuvastatin (CRESTOR) 20 MG tablet Take 1 tablet by mouth Daily. 30 tablet 11   • [DISCONTINUED] glucose blood test strip One touch ultra blue test strips to test 4 times a day  Dx-e11.9 450 each 3   • [DISCONTINUED] Insulin Lispro (HUMALOG) 100 UNIT/ML solution pen-injector 30 units with meals 9 pen 11     No facility-administered encounter medications on file as of 5/27/2020.        Orders placed during this encounter include:  Orders Placed This Encounter   Procedures   • Comprehensive Metabolic Panel     Standing Status:   Future     Standing Expiration Date:   5/27/2021   •  Hemoglobin A1c     Standing Status:   Future     Standing Expiration Date:   5/27/2021   • Lipid Panel     Standing Status:   Future     Standing Expiration Date:   5/27/2021   • Vitamin D 25 Hydroxy     Standing Status:   Future     Standing Expiration Date:   5/27/2021   • Vitamin B12     Standing Status:   Future     Standing Expiration Date:   5/27/2021   • TSH     Standing Status:   Future     Standing Expiration Date:   5/27/2021   • Protein / Creatinine Ratio, Urine - Urine, Clean Catch     Standing Status:   Future     Standing Expiration Date:   5/27/2021   • Microalbumin / Creatinine Urine Ratio - Urine, Clean Catch     Standing Status:   Future     Standing Expiration Date:   5/27/2021   • CBC & Differential     Standing Status:   Future     Standing Expiration Date:   5/27/2021     Order Specific Question:   Manual Differential     Answer:   No      Type 2 Diabetes Mellitus     Diabetes mellitus type 2 not controlled with hyperglycemia            Lab Results   Component Value Date     HGBA1C 10.70 (H) 12/10/2019                  Metformin , can't take , diarrhea , even ER      Soliqua - 55 units --- increased 60        Novolog per insurance       3 units per 15 grams per CHO    Aims for low carb       and 2 per 50       invokana 100 mg daily      Scarring , use afrezza instead         ---       Microvascular Monitoring     Yearly eye exam -- May 2020      Neuropathy-- bilateral feet      No medication requested at this time          foot exam done       Component      Latest Ref Rng & Units 12/10/2019 12/10/2019          10:12 AM 10:12 AM   Protein/Creatinine Ratio      0.0 - 200.0 mg/G Crea 203.4 (H)    Creatinine, Urine      mg/dL 275.3 275.3   Total Protein, Urine      mg/dL 56.0    Microalbumin/Creatinine Ratio      mg/g  82.5   Microalbumin, Urine      mg/dL  22.7             Morbid obesity due to excess calories     Patient's Body mass index is 44.75 kg/m². BMI is above normal parameters.  Recommendations include: nutrition counseling.        Mixed hyperlipidemia due to type 2 diabetes mellitus         crestor 20 mg at night         Total Cholesterol   Date Value Ref Range Status   12/10/2019 185 0 - 200 mg/dL Final     Triglycerides   Date Value Ref Range Status   12/10/2019 198 (H) 0 - 150 mg/dL Final     HDL Cholesterol   Date Value Ref Range Status   12/10/2019 44 40 - 60 mg/dL Final     LDL Cholesterol    Date Value Ref Range Status   12/10/2019 101 (H) 0 - 100 mg/dL Final              Hypertension associated with diabetes           On lisinopril 5 mg daily            Vitamin d def.           Start taking vitamin d 5000 units daily         Component      Latest Ref Rng & Units 12/10/2019   25 Hydroxy, Vitamin D      30.0 - 100.0 ng/ml 19.1 (L)                   4. Follow-up: Return in about 3 months (around 8/27/2020) for Recheck.

## 2020-06-19 ENCOUNTER — TELEPHONE (OUTPATIENT)
Dept: ENDOCRINOLOGY | Facility: CLINIC | Age: 65
End: 2020-06-19

## 2020-06-19 NOTE — TELEPHONE ENCOUNTER
[facility] - [facility], [place] - [address] - [phone]  - [phone] FX    Insurance won't cover 1 touch needs to be   [employer] me meter and needs test strips and meter     Ph [phone] ref# [id number] for verbal     Thank You

## 2020-12-27 RX ORDER — INSULIN GLARGINE AND LIXISENATIDE 100; 33 U/ML; UG/ML
INJECTION, SOLUTION SUBCUTANEOUS
Qty: 15 ML | Refills: 5 | Status: SHIPPED | OUTPATIENT
Start: 2020-12-27